# Patient Record
Sex: FEMALE | Race: ASIAN | NOT HISPANIC OR LATINO
[De-identification: names, ages, dates, MRNs, and addresses within clinical notes are randomized per-mention and may not be internally consistent; named-entity substitution may affect disease eponyms.]

---

## 2018-01-09 ENCOUNTER — RESULT REVIEW (OUTPATIENT)
Age: 58
End: 2018-01-09

## 2018-12-03 ENCOUNTER — APPOINTMENT (OUTPATIENT)
Dept: DERMATOLOGY | Facility: CLINIC | Age: 58
End: 2018-12-03
Payer: COMMERCIAL

## 2018-12-03 VITALS
WEIGHT: 116 LBS | SYSTOLIC BLOOD PRESSURE: 120 MMHG | HEIGHT: 61 IN | BODY MASS INDEX: 21.9 KG/M2 | DIASTOLIC BLOOD PRESSURE: 80 MMHG

## 2018-12-03 DIAGNOSIS — D22.9 MELANOCYTIC NEVI, UNSPECIFIED: ICD-10-CM

## 2018-12-03 DIAGNOSIS — Z87.898 PERSONAL HISTORY OF OTHER SPECIFIED CONDITIONS: ICD-10-CM

## 2018-12-03 DIAGNOSIS — Z86.39 PERSONAL HISTORY OF OTHER ENDOCRINE, NUTRITIONAL AND METABOLIC DISEASE: ICD-10-CM

## 2018-12-03 PROCEDURE — 99202 OFFICE O/P NEW SF 15 MIN: CPT

## 2018-12-04 PROBLEM — D22.9 CONGENITAL MELANOCYTIC NEVUS: Status: ACTIVE | Noted: 2018-12-03

## 2019-01-14 ENCOUNTER — APPOINTMENT (OUTPATIENT)
Dept: VASCULAR SURGERY | Facility: CLINIC | Age: 59
End: 2019-01-14
Payer: COMMERCIAL

## 2019-01-14 VITALS
SYSTOLIC BLOOD PRESSURE: 158 MMHG | HEIGHT: 61 IN | BODY MASS INDEX: 22.66 KG/M2 | DIASTOLIC BLOOD PRESSURE: 92 MMHG | HEART RATE: 67 BPM | WEIGHT: 120 LBS | TEMPERATURE: 97.6 F

## 2019-01-14 PROCEDURE — 93970 EXTREMITY STUDY: CPT

## 2019-01-14 PROCEDURE — 99203 OFFICE O/P NEW LOW 30 MIN: CPT

## 2019-01-14 NOTE — PHYSICAL EXAM
[de-identified] : On physical examination the patient is NAD, NCAT. HEENT- WNL. Neurologically intact. The lungs are clear and the heart has a regular rate and rhythm. The abdomen is soft, without tenderness or pulsatile mass. Bilateral femoral and pedal pulses are palpable. Right medial and anterior calf varicose veins. No lower extremity wounds.\par \par A lower extremity venous duplex ultrasound showed:\par -No evidence of DVT/SVT in either extremity\par -Right: reflux in the GSV and calf tributaries\par

## 2019-01-14 NOTE — HISTORY OF PRESENT ILLNESS
[FreeTextEntry1] : I have just had the pleasure of seeing Mrs. Leslie Mendoza ( [birth year]) in consultation for right lower extremity varicose veins.\par \par Mrs. Mendoza is a nice 58-year-old lady who presents with a one year history of right medial and anterior leg varicose veins. She reports pain and pruritus over the varicosities. She denies any history of lower extremity edema, open or healed ulcers. She denies any history of lower extremity DVT, SVT, or other thromboembolic disease. She denies any history of lower extremity claudication, rest pain, or tissue loss. She has been wearing compression stockings for six months. She takes NSAIDS as needed for pain. 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 \par \par Her medical and surgical history is otherwise significant for hypertension and diabetes mellitus\par \par Medications: Norvasc, Janumet\par \par Allergies: NKDA\par \par Social history: Non-smoker\par \par FH: NC\par

## 2019-01-14 NOTE — ASSESSMENT
[FreeTextEntry1] : In summary, Mrs. Fontana presents with right lower extremity venous insufficiency. We will schedule her right GSV ablation, followed by stab phlebectomy, at her convenience. \par \par Thank you for allowing me to participate in the care of this nice lady. Please do not hesitate to call with any questions.\par   \par

## 2019-03-12 ENCOUNTER — OTHER (OUTPATIENT)
Age: 59
End: 2019-03-12

## 2019-03-12 RX ORDER — SODIUM CHLORIDE 9 G/ML
0.9 INJECTION, SOLUTION INTRAVENOUS
Qty: 1 | Refills: 0 | Status: ACTIVE | COMMUNITY
Start: 2019-03-12 | End: 1900-01-01

## 2019-03-12 RX ORDER — SODIUM BICARBONATE 84 MG/ML
8.4 INJECTION, SOLUTION INTRAVENOUS
Qty: 1 | Refills: 0 | Status: ACTIVE | COMMUNITY
Start: 2019-03-12 | End: 1900-01-01

## 2019-03-12 RX ORDER — LIDOCAINE HYDROCHLORIDE 10 MG/ML
1 INJECTION, SOLUTION INFILTRATION; PERINEURAL
Qty: 1 | Refills: 0 | Status: ACTIVE | COMMUNITY
Start: 2019-03-12 | End: 1900-01-01

## 2019-03-20 ENCOUNTER — APPOINTMENT (OUTPATIENT)
Dept: VASCULAR SURGERY | Facility: CLINIC | Age: 59
End: 2019-03-20
Payer: COMMERCIAL

## 2019-03-20 PROCEDURE — 36475 ENDOVENOUS RF 1ST VEIN: CPT | Mod: RT

## 2019-03-20 NOTE — PROCEDURE
[FreeTextEntry3] : Procedural safety checklist and time out completed:\par Confirmed patient identification (Patient Name, , and/or medical record number including when possible affirmation by patient or parent/family/other.\par Confirmed procedure with the patient. Consent present, accurate and signed. \par Confirmed special equipment and supplies are present.\par Sterility confirmed. Position verified. \par Site/ side is marked and visible and confirmed. \par Procedure confirmed by consent. Accurate consent including side and site.\par Review of medical records noting correct procedure including site and side.\par MD/PA verifies presence and review of imaging studies and or written report of imaging studies.\par Specify equipment are available for the planned procedure.\par MD/PA has marked the patient's procedural site and side.\par Agreement on the procedure to be performed\par Time out completed.\par All of the above has been confirmed by the team.\par All patient-specific concerns have been addressed\par \par Indication: Right lower extremity varicose veins with leg pain, leg swelling, and leg cramping.  Venous insufficiency/ reflux.\par \par Procedure: radiofrequency ablation of the right great saphenous vein. \par 	\par [Ms. JAMIN HUANG is a 58 year old F with a history of right lower extremity varicose veins previously seen in the office.  Ultrasound examination demonstrated venous insufficiency. A trial of compression stockings, exercise, elevation, and pain medication was attempted without relief and definitive treatment with radiofrequency ablation was offered. \par \par The patient has come for radiofrequency ablation treatment of the right great saphenous vein. \par I have discussed the risks of the procedure at length with the patient. The risks discussed were inclusive of but not limited to infection, irritation at the site of infiltration of local anesthesia, and also rare risk of deep venous thrombosis and pulmonary emboli. The patient agrees to proceed with the procedure. \par The patient was escorted into the procedure room and a time out called.\par The entire limb was prepped and draped in sterile fashion. The RF fiber was placed on the sterile field and connected by a sterile cable. Actuation, temperature, and impedance testing were performed to ensure that all components were connected and operating properly. \par The patient was placed on the procedure table and local anesthesia was instilled in the skin overlying the access site. Under ultrasound guidance, the vein was punctured with a micropuncture needle, using the anterior wall technique. A guide wire was now introduced through the needle, and the needle was then exchanged over the guide wire for a 7F sheath. The guide wire was removed and the RF probe was then placed into the right great saphenous vein through the sheath and position confirmed using ultrasound guidance. After the RF probe position was verified by ultrasound, tumescent anesthesia consisting of normal saline, 1% lidocaine with 8.4% sodium bicarbonate was infiltrated, under ultrasound guidance, precisely into the perivenous compartment along the entire length of the vein until a “halo” of fluid was noted around the vein. After RF probe position was again confirmed with ultrasound imaging, RF energy was applied. The probe was gradually and carefully withdrawn at a rate of 6.5cm/20seconds. \par \par The total volume injected was 250 c. \par Total treatment time was 80 seconds.\par Treatment length was 20 cm and 4 cycles of RF performed using the 7 cm probe. \par The probe is 3.6 cm from the SFJ.\par \par Estimated Blood Loss: minimal\par Repeat ultrasound of the treated vein was performed confirming successful treatment. The catheter and sheath were withdrawn and hemostasis established with direct pressure. After assuring hemostasis, a sterile 4x4 was placed on the access site and an ACE compression wrap was applied. Patient tolerated procedure well. Patient was given post-procedure instructions and follow up appointment was scheduled.\par \par \par  [FreeTextEntry1] : Right GSV RFA

## 2019-03-25 ENCOUNTER — APPOINTMENT (OUTPATIENT)
Dept: VASCULAR SURGERY | Facility: CLINIC | Age: 59
End: 2019-03-25
Payer: COMMERCIAL

## 2019-03-25 PROCEDURE — 93971 EXTREMITY STUDY: CPT

## 2019-04-17 ENCOUNTER — APPOINTMENT (OUTPATIENT)
Dept: VASCULAR SURGERY | Facility: CLINIC | Age: 59
End: 2019-04-17
Payer: COMMERCIAL

## 2019-04-17 DIAGNOSIS — I83.893 VARICOSE VEINS OF BILATERAL LOWER EXTREMITIES WITH OTHER COMPLICATIONS: ICD-10-CM

## 2019-04-17 PROCEDURE — 37765 STAB PHLEB VEINS XTR 10-20: CPT | Mod: RT

## 2019-04-17 NOTE — PROCEDURE
[FreeTextEntry1] : right stab phlebectomy [FreeTextEntry3] : Procedural safety checklist and time out completed:\par Confirmed patient identification (Patient Name, , and/or medical record number including when possible affirmation by patient or parent/family/other.\par Confirmed procedure with the patient. Consent present, accurate and signed. \par Confirmed special equipment and supplies are present.\par Sterility confirmed. Position verified. \par Site/ side is marked and visible and confirmed. \par Procedure confirmed by consent. Accurate consent including side and site.\par Review of medical records noting correct procedure including site and side.\par MD/PA verifies presence and review of imaging studies and or written report of imaging studies.\par Specify equipment are available for the planned procedure.\par MD/PA has marked the patient's procedural site and side.\par Agreement on the procedure to be performed\par Time out completed.\par All of the above has been confirmed by the team.\par All patient-specific concerns have been addressed. \par \par Indication:  Right lower extremity varicose veins with inflammation, leg pain, leg swelling, and leg cramping.  \par \par Procedure: Stab phlebectomy right lower extremity\par \par  Ms. JAMIN HUANG is a 58 year old F with a history of symptomatic right lower extremity varicose veins. A trial of compression stockings, exercise, elevation, and pain medication was attempted without relief and definitive treatment with microphlebectomy was offered. \par \par I have discussed the risks of the procedure at length with the patient. The risks discussed were inclusive of but not limited to infection, irritation at the site of infiltration of local anesthesia, and also rare risk of deep venous thrombosis and pulmonary emboli. The patient agrees to proceed with the procedure. \par The patient was escorted into the procedure room, the varicose veins for treatment were marked out and the patient placed on the examination table. The entire limb was prepped and draped in sterile fashion and a  time out was called. \par \par Local anesthesia using 25 cc 1% lidocaine was infiltrated using a 25 gauge needle over the previously marked prominent varicose vein sites.\par Multiple small stab incisions, each less than 1 cm in length was made at the noted sites. With the help of a vein hook, the vein was fished out at each of these sites, rolled over a narrow-tipped mosquito clamp and removed. Hemostasis was secured with leg elevation and application of manual pressure. After assuring hemostasis, a sterile 4x4 was placed on the access sites and an ACE compression wrap was applied. Estimated blood loss: minimal. Patient tolerated procedure well. Patient was given post-procedure instructions and follow up appointment was scheduled. \par \par SIDE - RIGHT\par SITE - CALF / THIGH\par LOCATION - PROXIMAL / DISTAL / MEDIAL / LATERAL \par \par Total Stab Incisions 25\par \par

## 2019-04-29 ENCOUNTER — APPOINTMENT (OUTPATIENT)
Dept: VASCULAR SURGERY | Facility: CLINIC | Age: 59
End: 2019-04-29
Payer: COMMERCIAL

## 2019-04-29 VITALS
WEIGHT: 120 LBS | BODY MASS INDEX: 22.66 KG/M2 | TEMPERATURE: 97.6 F | HEIGHT: 61 IN | HEART RATE: 74 BPM | DIASTOLIC BLOOD PRESSURE: 95 MMHG | SYSTOLIC BLOOD PRESSURE: 180 MMHG

## 2019-04-29 PROCEDURE — 93971 EXTREMITY STUDY: CPT

## 2019-04-29 PROCEDURE — 99024 POSTOP FOLLOW-UP VISIT: CPT

## 2019-04-29 NOTE — HISTORY OF PRESENT ILLNESS
[FreeTextEntry1] : I have just had the pleasure of seeing Mrs. Leslie Mendoza ( [birth year]) in follow up for right lower extremity varicose veins.\par \par Mrs. Mendoza is a nice 58-year-old lady who underwent right GSV ablation on 3/20/19 and stab phlebectomy on 19. Since phlebectomy, she reports pain and tenderness in the medial right calf. She denies any edema. Her incisions are healing well and she denies any erythema, induration, flucutance, or drainage. She reports a focal area of numbness in the anterior right shin. She takes NSAIDS as needed for pain. \par \par Her medical and surgical history is otherwise significant for hypertension and diabetes mellitus\par \par Medications: Norvasc, Janumet\par \par Allergies: NKDA\par \par Social history: Non-smoker\par \par FH: NC\par

## 2019-04-29 NOTE — PHYSICAL EXAM
[de-identified] : On physical examination the patient is NAD, NCAT. HEENT- WNL. Neurologically intact. The lungs are clear and the heart has a regular rate and rhythm. The abdomen is soft, without tenderness or pulsatile mass. Bilateral femoral and pedal pulses are palpable. Right leg stab phlebectomy wounds are clean/dry/intact. No lower extremity wounds.\par \par A lower extremity venous duplex ultrasound showed:\par -Occlusion of the right GSV in the calf and thigh\par

## 2019-04-29 NOTE — ASSESSMENT
[FreeTextEntry1] : In summary, Mrs. Mendoza is s/p right leg stab phlebectomy. She has developed an SVT in the peripheral GSV. I instructed her to continue with compression therapy and take Ibuprofen to reduce inflammation (200 mg TID) and alleviate pain.

## 2019-05-20 ENCOUNTER — APPOINTMENT (OUTPATIENT)
Dept: VASCULAR SURGERY | Facility: CLINIC | Age: 59
End: 2019-05-20
Payer: COMMERCIAL

## 2019-05-20 VITALS
SYSTOLIC BLOOD PRESSURE: 123 MMHG | WEIGHT: 120 LBS | HEART RATE: 89 BPM | BODY MASS INDEX: 22.66 KG/M2 | DIASTOLIC BLOOD PRESSURE: 81 MMHG | HEIGHT: 61 IN

## 2019-05-20 PROCEDURE — 99024 POSTOP FOLLOW-UP VISIT: CPT

## 2019-05-20 NOTE — ASSESSMENT
[FreeTextEntry1] : In summary, Mrs. Mendoza is s/p RLE GSV RFA and stab phlebectomy. She is doing well. I encouraged her to continue to wear compression stockings. She will follow up as needed.

## 2019-05-20 NOTE — HISTORY OF PRESENT ILLNESS
[FreeTextEntry1] : I have just had the pleasure of seeing Mrs. Leslie Mendoza ( [birth year]) in follow up for right lower extremity varicose veins.\par \par Mrs. Mendoza is a nice 58-year-old lady who underwent right GSV ablation on 3/20/19 and stab phlebectomy on 19. During her last visit, she complained of pain and tenderness in the medial right calf. She was instructed to take Ibuprofen, and she now reports that the discomfort has essentially resolved. She also reports that the numbness she experience along her anterior shin has also resolved. She denies any edema. Her incisions are healing well and she denies any erythema, induration, flucutance, or drainage. She is otherwise without complaints. \par \par Her medical and surgical history is significant for hypertension and diabetes mellitus\par \par Medications: Norvasc, Janumet\par \par Allergies: NKDA\par \par Social history: Non-smoker\par \par FH: NC\par

## 2019-05-20 NOTE — PHYSICAL EXAM
[de-identified] : On physical examination the patient is NAD, NCAT. HEENT- WNL. Neurologically intact. The lungs are clear and the heart has a regular rate and rhythm. The abdomen is soft, without tenderness or pulsatile mass. Bilateral femoral and pedal pulses are palpable. Right leg stab phlebectomy wounds are clean/dry/intact. No lower extremity wounds.\par \par \par \par